# Patient Record
Sex: MALE | Race: WHITE
[De-identification: names, ages, dates, MRNs, and addresses within clinical notes are randomized per-mention and may not be internally consistent; named-entity substitution may affect disease eponyms.]

---

## 2024-03-29 ENCOUNTER — APPOINTMENT (OUTPATIENT)
Dept: PAIN MANAGEMENT | Facility: CLINIC | Age: 64
End: 2024-03-29
Payer: MEDICARE

## 2024-03-29 VITALS
DIASTOLIC BLOOD PRESSURE: 72 MMHG | SYSTOLIC BLOOD PRESSURE: 137 MMHG | WEIGHT: 200 LBS | BODY MASS INDEX: 28.63 KG/M2 | HEIGHT: 70 IN | HEART RATE: 90 BPM | OXYGEN SATURATION: 95 %

## 2024-03-29 PROBLEM — Z00.00 ENCOUNTER FOR PREVENTIVE HEALTH EXAMINATION: Status: ACTIVE | Noted: 2024-03-29

## 2024-03-29 PROCEDURE — 99203 OFFICE O/P NEW LOW 30 MIN: CPT | Mod: 25

## 2024-03-29 PROCEDURE — 20552 NJX 1/MLT TRIGGER POINT 1/2: CPT

## 2024-03-29 RX ORDER — CYCLOBENZAPRINE HYDROCHLORIDE 5 MG/1
5 TABLET, FILM COATED ORAL
Qty: 45 | Refills: 1 | Status: ACTIVE | COMMUNITY
Start: 2024-03-29 | End: 1900-01-01

## 2024-03-29 RX ORDER — HYDROCODONE BITARTRATE AND ACETAMINOPHEN 10; 325 MG/1; MG/1
10-325 TABLET ORAL
Refills: 0 | Status: ACTIVE | COMMUNITY

## 2024-03-29 RX ORDER — ALLOPURINOL 300 MG/1
300 TABLET ORAL
Refills: 0 | Status: ACTIVE | COMMUNITY

## 2024-03-29 NOTE — HISTORY OF PRESENT ILLNESS
[Neck Pain] : neck pain [Headache] : headache [Hand Numbness] : hand numbness [___ yrs] : [unfilled] year(s) ago [Constant] : constant [7] : an average pain level of 7/10 [8] : a maximum pain level of 8/10 [3] : a minimum pain level of 3/10 [Aching] : aching [Electric] : electric [Standing] : standing [Walking] : walking [Transitioning] : transitioning [Bending] : bending [Lifting] : lifting [Turning Head] : turning head [Looking Up] : looking up [Looking Down] : looking down [Medications] : medications [Laying] : laying [Rest] : rest [Heat] : heat [FreeTextEntry1] : HPI - Mr. Tyler Redd, a 64-year-old male right hand dominant with a past medical history as outlined below, was referred by his orthopedic doctor and presents today with a chief complaint of neck pain, headaches, and hand numbness that began approximately one year ago. The pain originates in his neck, extending down to the shoulder and left arm, radiating further to affect his overall comfort. Mr. Redd describes the pain as constant, with an intensity that fluctuates between a minimum of 3/10 and a maximum of 8/10, averaging around 7/10 on the numerical rating scale. The pain is described as both aching and electric in nature, accompanied by numbness and tingling sensations. It worsens with physical activities such as standing, walking, bending, and turning his head, and is notably aggravated by looking upwards or downwards. Conversely, the pain is alleviated by lying down, using medications (hydrocodone and aspirin as prescribed by his orthopedic doctor), applying heat, and resting. Despite the discomfort, Mr. Redd has not pursued physical therapy, acupuncture, or chiropractic interventions. He has been maintainied on Hydrocodone by his rheumaotlogist, Dr Bryan, also has tried Gabapentin and Naproxen. The symptoms have persisted and progressed despite treatment in the last three months, affecting his ability to perform daily activities and significantly impairing his quality of life. However, he reports no numbness or weakness at present, denies any bowel or bladder incontinence, saddle anesthesia, or other red flag symptoms, and maintains regular bowel movements.   [FreeTextEntry4] : hydrocodone and aspirin prescribed by their ortho doctor. patient has not done PT, acupuncture or chiropractor. [de-identified] : numbness and tingling. [FreeTextEntry7] : neck pain going down to the shoulder and left arm.

## 2024-03-29 NOTE — DATA REVIEWED
[FreeTextEntry1] : [facility] PDMP Reference #: [id number]  Practitioner Count: 2 Pharmacy Count: 1 Current Opioid Prescriptions: 0 Current Benzodiazepine Prescriptions: 0 Current Stimulant Prescriptions: 0   Patient Demographic Information (PDI)     PDI	First Name	Last Name	Birth Date	Gender	Street Address	City	State	Zip Code [place] Redd	1960	Male	[address] [place]	[address]  Prescription Information    PDI Filter:   PDI	My Rx	Current Rx	Drug Type	Rx Written	Rx Dispensed	Drug	Quantity	Days Supply	Prescriber Name	Prescriber STEPHEN #	Payment Method	Dispenser A	N	N	O	02/16/2024	02/18/2024	hydrocodone-acetaminophen 5-325 mg tablet	30	30	Jayy Cespedes Y	[id number]	Medicare	[facility] [id number]	N	N	O	01/17/2024	01/17/2024	hydrocodone-acetaminophen 5-325 mg tablet	25	25	Sonia Bryan S	[id number]	Medicare	[facility] [id number]	N	N	O	12/20/2023	12/21/2023	hydrocodone-acetaminophen 5-325 mg tablet	25	25	Sonia Bryan S	[id number]	Medicare	[facility] [id number]	N	N	O	11/15/2023	11/16/2023	hydrocodone-acetaminophen 5-325 mg tablet	25	25	Randi Sonia S	[id number]	Medicare	[facility] [id number]	N	N	O	09/26/2023	09/27/2023	hydrocodone-acetaminophen 5-325 mg tablet	25	25	Randi Sonia S	[id number]	Medicare	[facility] [id number]	N	N	O	08/10/2023	08/10/2023	hydrocodone-acetaminophen 5-325 mg tablet	25	25	Sonia Bryan S	[id number]	Medicare	[facility] [id number]	N	N	B	06/06/2023	06/06/2023	lorazepam 1 mg tablet	15	5	Yamilet, Ramirez	[id number]	Medicare	[facility] [id number] A	N	N	O	04/07/2023	04/07/2023	hydrocodone-acetaminophen 5-325 mg tablet	25	25	Sonia Bryan S	[id number]	Medicare	[facility] [id number]

## 2024-03-29 NOTE — PHYSICAL EXAM
[de-identified] : General: Well-developed and well-nourished.  No acute distress. Psychiatric: Behavior was cooperative   Head:  Normocephalic and atraumatic Eyes:  Sclera white. Conjunctiva and eyelids pink and moist without discharge. Cardiovascular:  Regular Respiratory:  Trachea midline. Normal effort. No accessory muscle use with respiration Abdomen: Non distended, soft and nontender Skin:  No rashes, ulcers, or lesions appreciated. Neck: There is pain with extension,     ROM wnl Back  There is no pain with extension,   ROM wnl ++ TTP Cervical Paraspinals Extremities: No edema  Musculoskeletal: Moving all extremities freely  Neuro: CN 2-12 Grossly intact Sensation to light touch is intact in all extremities Gait: Ambulates with no assistive device.

## 2024-03-29 NOTE — CONSULT LETTER
[Dear  ___] : Dear  [unfilled], [Consult Letter:] : I had the pleasure of evaluating your patient, [unfilled]. [Please see my note below.] : Please see my note below. [Consult Closing:] : Thank you very much for allowing me to participate in the care of this patient.  If you have any questions, please do not hesitate to contact me. [Sincerely,] : Sincerely, [FreeTextEntry3] : Devin Marlow DO

## 2024-03-29 NOTE — ASSESSMENT
[FreeTextEntry1] : Mr. EDU MYLES is a 64 year M suffering from neck pain, that based upon today's subjective complaints, physical examination, and imaging review, is likely secondary to cervical spondylosis   >> Medications  Chronic opioid use for non-malignant pain, in particular at high doses would not be recommended since it can potentially lead to hyperalgesia (hypersensitivity), tolerance and addiction.    Flexeril 5mg po bid prn    >> Interventions   None indicated at this time. Consider for cervical diagnostic MBB   >> Therapy and Other Modalities   diagnosis: cervical spondylosis  periscapular and scapulothoracic stretching and strengthening teach home exercise regimen massage and myofascial release increase ROM, strengthening, postural training, other modalities ad nemes[facility] physical therapy - 2x weekly / 6 weeks Precautions - universal safety, falls  >> Imaging and Other Studies  XRAY Neck and Left Shoulder  >> Consults  None ordered   --   PROCEDURE NOTE  PATIENT NAME: Ranjeet MYLES    DATE OF BIRTH: []  ATTENDING:  Devin Marlow DO  ASSISTANT:  None  PREPROCEDURE DIAGNOSIS:   Myalgia  POSTPROCEDURE DIAGNOSIS: Myalgia  PROCEDURE PERFORMED:  Trigger Point Injections - Number of Muscle Groups BILATERAL Cervical paraspinal Muscles  CONTRAINDICATIONS: None  PROCEDURE IN DETAIL:  I explained the details of the procedure to the patient including the risks, benefits and alternatives. We had an informed discussion and the patient verbalized understanding and signed the consent form. All questions were answered appropriately.   The area with palpable trigger points was identified and included the  BILATERAL Cervical paraspinal Muscles muscle. A 23 G 1 in needle was used to enter multiple trigger points identified. After negative aspiration, each trigger point was injected with a mixture of 3ml 0.25% Bupivacaine mixed with 2 ml of 1% Lidocaine. A total of 5 ml was used.   COMPLICATIONS:  None  BLOOD LOSS: None 								 DISPOSITION: 1. Discharged home with instructions.  2. Follow up in the Pain Center in 2-4 weeks or on an as needed basis.

## 2024-04-19 ENCOUNTER — APPOINTMENT (OUTPATIENT)
Dept: PAIN MANAGEMENT | Facility: CLINIC | Age: 64
End: 2024-04-19
Payer: MEDICARE

## 2024-04-19 VITALS
BODY MASS INDEX: 28.63 KG/M2 | WEIGHT: 200 LBS | SYSTOLIC BLOOD PRESSURE: 162 MMHG | HEIGHT: 70 IN | OXYGEN SATURATION: 97 % | HEART RATE: 90 BPM | DIASTOLIC BLOOD PRESSURE: 84 MMHG

## 2024-04-19 DIAGNOSIS — M79.10 MYALGIA, UNSPECIFIED SITE: ICD-10-CM

## 2024-04-19 DIAGNOSIS — M47.812 SPONDYLOSIS W/OUT MYELOPATHY OR RADICULOPATHY, CERVICAL REGION: ICD-10-CM

## 2024-04-19 DIAGNOSIS — M75.52 BURSITIS OF LEFT SHOULDER: ICD-10-CM

## 2024-04-19 PROCEDURE — 99214 OFFICE O/P EST MOD 30 MIN: CPT | Mod: 25

## 2024-04-19 PROCEDURE — 20610 DRAIN/INJ JOINT/BURSA W/O US: CPT | Mod: LT

## 2024-07-02 ENCOUNTER — NON-APPOINTMENT (OUTPATIENT)
Age: 64
End: 2024-07-02

## 2024-10-18 ENCOUNTER — NON-APPOINTMENT (OUTPATIENT)
Age: 64
End: 2024-10-18